# Patient Record
Sex: FEMALE | Race: BLACK OR AFRICAN AMERICAN
[De-identification: names, ages, dates, MRNs, and addresses within clinical notes are randomized per-mention and may not be internally consistent; named-entity substitution may affect disease eponyms.]

---

## 2019-09-25 ENCOUNTER — HOSPITAL ENCOUNTER (EMERGENCY)
Dept: HOSPITAL 26 - MED | Age: 49
Discharge: HOME | End: 2019-09-25
Payer: COMMERCIAL

## 2019-09-25 VITALS — SYSTOLIC BLOOD PRESSURE: 111 MMHG | DIASTOLIC BLOOD PRESSURE: 78 MMHG

## 2019-09-25 VITALS — BODY MASS INDEX: 24.35 KG/M2 | HEIGHT: 65 IN | WEIGHT: 146.13 LBS

## 2019-09-25 DIAGNOSIS — J30.9: Primary | ICD-10-CM

## 2019-09-25 NOTE — NUR
PT BIB SELF FOR RUNNY NOSE, CONGESTION, AND SORETHROAT. PT STATES SHE HAS CLEAR 
MUCUS, NORMALLY TAKES OTC ALLERGY MEDS BUT THEY HAVE NOT BEEN WORKING FOR HER. 
RR EVEN AND UNLABORED, BL BS CLEAR. PT SITTING IN CHAIR, AWAKE AND CALM.

## 2019-09-25 NOTE — NUR
DISCHARGE PAPERS GIVEN TO PT. PT STATES RELIEF. VSS.  RX OF PROMETHAZINE, 
LORATADINE AND FLONASE GIVEN. SIDE EFFECTS EXPLAINED. INSTRUCTED TO F/U WITH 
PCP AND WHEN TO RETURN TO ER. PT VERBALLIZED UNDERSTANDING OF DC INSTRUCTIONS. 
ALL QUESTIONS ANSWERED.